# Patient Record
Sex: MALE
[De-identification: names, ages, dates, MRNs, and addresses within clinical notes are randomized per-mention and may not be internally consistent; named-entity substitution may affect disease eponyms.]

---

## 2019-07-01 PROBLEM — Z00.00 ENCOUNTER FOR PREVENTIVE HEALTH EXAMINATION: Status: ACTIVE | Noted: 2019-07-01

## 2019-07-08 ENCOUNTER — APPOINTMENT (OUTPATIENT)
Dept: UROLOGY | Facility: CLINIC | Age: 74
End: 2019-07-08
Payer: MEDICARE

## 2019-07-08 VITALS
TEMPERATURE: 97.1 F | DIASTOLIC BLOOD PRESSURE: 82 MMHG | WEIGHT: 225 LBS | SYSTOLIC BLOOD PRESSURE: 140 MMHG | HEIGHT: 69 IN | HEART RATE: 98 BPM | RESPIRATION RATE: 16 BRPM | BODY MASS INDEX: 33.33 KG/M2

## 2019-07-08 DIAGNOSIS — R35.1 BENIGN PROSTATIC HYPERPLASIA WITH LOWER URINARY TRACT SYMPMS: ICD-10-CM

## 2019-07-08 DIAGNOSIS — N40.1 BENIGN PROSTATIC HYPERPLASIA WITH LOWER URINARY TRACT SYMPMS: ICD-10-CM

## 2019-07-08 DIAGNOSIS — Z78.9 OTHER SPECIFIED HEALTH STATUS: ICD-10-CM

## 2019-07-08 DIAGNOSIS — N47.1 PHIMOSIS: ICD-10-CM

## 2019-07-08 DIAGNOSIS — N13.8 BENIGN PROSTATIC HYPERPLASIA WITH LOWER URINARY TRACT SYMPMS: ICD-10-CM

## 2019-07-08 PROCEDURE — 99204 OFFICE O/P NEW MOD 45 MIN: CPT

## 2019-07-08 RX ORDER — CLOBETASOL PROPIONATE 0.5 MG/G
0.05 CREAM TOPICAL TWICE DAILY
Qty: 30 | Refills: 0 | Status: ACTIVE | COMMUNITY
Start: 2019-07-08 | End: 1900-01-01

## 2019-07-08 NOTE — ASSESSMENT
[FreeTextEntry1] : \par \par Impression/plan: 73 yo gentleman with BPH/LUTS and phimosis. \par \par 1. Steroid cream for phimosis. \par 2. Add finasteride to tamsulosin for BPH/LUTS, se discussed.

## 2019-07-08 NOTE — PHYSICAL EXAM
[General Appearance - Well Developed] : well developed [General Appearance - Well Nourished] : well nourished [Well Groomed] : well groomed [Normal Appearance] : normal appearance [General Appearance - In No Acute Distress] : no acute distress [Edema] : no peripheral edema [Respiration, Rhythm And Depth] : normal respiratory rhythm and effort [Exaggerated Use Of Accessory Muscles For Inspiration] : no accessory muscle use [Abdomen Soft] : soft [Abdomen Tenderness] : non-tender [Costovertebral Angle Tenderness] : no ~M costovertebral angle tenderness [Normal Station and Gait] : the gait and station were normal for the patient's age [Skin Color & Pigmentation] : normal skin color and pigmentation [] : no rash [Skin Turgor] : supple [Oriented To Time, Place, And Person] : oriented to person, place, and time [No Focal Deficits] : no focal deficits [Penis Abnormality] : normal uncircumcised penis [Urinary Bladder Findings] : the bladder was normal on palpation [Scrotum] : the scrotum was normal [Testes Tenderness] : no tenderness of the testes [Prostate Tenderness] : the prostate was not tender [Testes Mass (___cm)] : there were no testicular masses [No Prostate Nodules] : no prostate nodules [Prostate Size ___ gm] : prostate size [unfilled] gm [FreeTextEntry1] : Phimosis noted.

## 2019-07-08 NOTE — HISTORY OF PRESENT ILLNESS
[FreeTextEntry1] : 73 yo gentleman with h/o BPH/LUTS, currently on Flomax 0.8 mg daily. He c/o slow stream, int, nocturia x 2, freq q 2 hrs, and sensation of incomplete emptying. Denies any irr/obs voiding symptoms. Denies UTI. He gave urine and blood to his PCP last week (Dr. Gentile). \par \par PVR - 90 ml. \par

## 2019-07-10 LAB
APPEARANCE: CLEAR
BACTERIA: NEGATIVE
BILIRUBIN URINE: NEGATIVE
BLOOD URINE: NEGATIVE
COLOR: NORMAL
GLUCOSE QUALITATIVE U: NEGATIVE
HYALINE CASTS: 0 /LPF
KETONES URINE: NEGATIVE
LEUKOCYTE ESTERASE URINE: NEGATIVE
MICROSCOPIC-UA: NORMAL
NITRITE URINE: NEGATIVE
PH URINE: 5.5
PROTEIN URINE: NEGATIVE
RED BLOOD CELLS URINE: 1 /HPF
SPECIFIC GRAVITY URINE: 1.02
SQUAMOUS EPITHELIAL CELLS: 1 /HPF
UROBILINOGEN URINE: NORMAL
WHITE BLOOD CELLS URINE: 1 /HPF

## 2019-07-15 ENCOUNTER — CHART COPY (OUTPATIENT)
Age: 74
End: 2019-07-15

## 2019-07-15 LAB — BACTERIA UR CULT: ABNORMAL

## 2020-06-26 ENCOUNTER — RX RENEWAL (OUTPATIENT)
Age: 75
End: 2020-06-26

## 2020-09-24 ENCOUNTER — RX RENEWAL (OUTPATIENT)
Age: 75
End: 2020-09-24

## 2021-03-19 ENCOUNTER — RX RENEWAL (OUTPATIENT)
Age: 76
End: 2021-03-19

## 2021-06-14 ENCOUNTER — RX RENEWAL (OUTPATIENT)
Age: 76
End: 2021-06-14

## 2021-09-13 ENCOUNTER — RX RENEWAL (OUTPATIENT)
Age: 76
End: 2021-09-13

## 2021-12-10 ENCOUNTER — RX RENEWAL (OUTPATIENT)
Age: 76
End: 2021-12-10

## 2022-03-10 ENCOUNTER — RX RENEWAL (OUTPATIENT)
Age: 77
End: 2022-03-10

## 2022-06-08 ENCOUNTER — RX RENEWAL (OUTPATIENT)
Age: 77
End: 2022-06-08

## 2022-09-06 ENCOUNTER — RX RENEWAL (OUTPATIENT)
Age: 77
End: 2022-09-06

## 2022-12-12 ENCOUNTER — RX RENEWAL (OUTPATIENT)
Age: 77
End: 2022-12-12

## 2023-03-10 RX ORDER — FINASTERIDE 5 MG/1
5 TABLET, FILM COATED ORAL
Qty: 30 | Refills: 0 | Status: ACTIVE | COMMUNITY
Start: 2019-07-08 | End: 1900-01-01

## 2024-06-06 ENCOUNTER — APPOINTMENT (OUTPATIENT)
Dept: GERIATRICS | Facility: CLINIC | Age: 79
End: 2024-06-06